# Patient Record
Sex: FEMALE | Race: WHITE | NOT HISPANIC OR LATINO | Employment: UNEMPLOYED | URBAN - METROPOLITAN AREA
[De-identification: names, ages, dates, MRNs, and addresses within clinical notes are randomized per-mention and may not be internally consistent; named-entity substitution may affect disease eponyms.]

---

## 2024-10-23 ENCOUNTER — OFFICE VISIT (OUTPATIENT)
Dept: FAMILY MEDICINE CLINIC | Facility: HOSPITAL | Age: 34
End: 2024-10-23
Payer: COMMERCIAL

## 2024-10-23 VITALS
WEIGHT: 194.6 LBS | HEIGHT: 65 IN | DIASTOLIC BLOOD PRESSURE: 80 MMHG | HEART RATE: 100 BPM | BODY MASS INDEX: 32.42 KG/M2 | SYSTOLIC BLOOD PRESSURE: 130 MMHG | TEMPERATURE: 98.2 F | OXYGEN SATURATION: 98 %

## 2024-10-23 DIAGNOSIS — N92.6 MISSED PERIOD: Primary | ICD-10-CM

## 2024-10-23 PROCEDURE — 99213 OFFICE O/P EST LOW 20 MIN: CPT

## 2024-10-23 NOTE — PROGRESS NOTES
Ambulatory Visit  Name: Yael Bird      : 1990      MRN: [id number]  Encounter Provider: Hortencia Parikh DO  Encounter Date: 10/23/2024   Encounter department: [facility] PRIMARY CARE [address]    Assessment & Plan  Missed period  - Will order quantitative testing to [facility]  - Continue to monitor menstrual periods  - May need hormonal testing at that point if complaints still not resolved  Orders:    hCG, quantitative; Future    hCG, quantitative       History of Present Illness     13-year-old female presents with  for complaint of missed period.  Patient states that her period used becomes on the 15th of every month, but has not happened this month.  States last time this has happened, she had a miscarriage which was in .  States her last pregnancy test was a few days ago and she has taken multiple which have been negative.  Denies being on any birth control.  Symptomatic wise she is reporting some on and off fatigue as well as some hip pain and low back pain which is now resolved.  Denies nausea, vomiting.  Patient entered menarche at age 10, mother and grandmother entered menopause in early 40s.  Denies any particular stressful event for the past month that would cause concern for reason to missed her menstrual period.  Patient states she does have a GYN that she would establish with in the event that she does become pregnant.        History obtained from : patient and patient's Significant Other  Review of Systems   Gastrointestinal:  Negative for abdominal pain, nausea and vomiting.   Genitourinary:  Positive for menstrual problem.   Musculoskeletal:  Positive for back pain.     No current outpatient medications on file prior to visit.     No current facility-administered medications on file prior to visit.      Social History     Tobacco Use    Smoking status: Never    Smokeless tobacco: Never   Vaping Use    Vaping status: Never Used   Substance and Sexual Activity     "Alcohol use: Not Currently     Comment: rarely    Drug use: No    Sexual activity: Yes     Partners: Male     Birth control/protection: None         Objective     /80   Pulse 100   Temp 98.2 °F (36.8 °C)   Ht 5' 4.5\" (1.638 m)   Wt 88.3 kg (194 lb 9.6 oz)   SpO2 98%   BMI 32.89 kg/m²     Physical Exam  Vitals reviewed. Exam conducted with a chaperone present.   Constitutional:       Appearance: Normal appearance.   HENT:      Head: Normocephalic and atraumatic.   Cardiovascular:      Rate and Rhythm: Normal rate and regular rhythm.      Heart sounds: Normal heart sounds.   Pulmonary:      Breath sounds: Normal breath sounds.   Neurological:      Mental Status: She is alert.   Psychiatric:         Mood and Affect: Mood normal.         Behavior: Behavior normal.         Hortencia Parikh, DO  Family Medicine    "

## 2024-10-24 ENCOUNTER — TELEPHONE (OUTPATIENT)
Age: 34
End: 2024-10-24

## 2024-10-24 DIAGNOSIS — N92.6 MISSED PERIOD: Primary | ICD-10-CM

## 2024-10-24 NOTE — TELEPHONE ENCOUNTER
Patient  called back states [facility] does not take insurance. Requesting order to be changed to [facility]. Advised order for [facility] still active.

## 2024-10-24 NOTE — TELEPHONE ENCOUNTER
Hcg quantitative test, reordered for patient to go to [facility], patients  called to follow up the [facility] location fax number is [phone].

## 2024-10-24 NOTE — TELEPHONE ENCOUNTER
Patient  calling requesting HCG lab order to be changed over to have lab done at [facility].      Please review.  Thank you

## 2025-03-24 ENCOUNTER — NURSE TRIAGE (OUTPATIENT)
Age: 35
End: 2025-03-24

## 2025-03-24 ENCOUNTER — HOSPITAL ENCOUNTER (EMERGENCY)
Facility: HOSPITAL | Age: 35
Discharge: HOME | End: 2025-03-24
Attending: EMERGENCY MEDICINE
Payer: COMMERCIAL

## 2025-03-24 VITALS
DIASTOLIC BLOOD PRESSURE: 64 MMHG | HEART RATE: 81 BPM | WEIGHT: 194 LBS | BODY MASS INDEX: 32.32 KG/M2 | HEIGHT: 65 IN | TEMPERATURE: 98.3 F | OXYGEN SATURATION: 99 % | OXYGEN SATURATION: 99 % | SYSTOLIC BLOOD PRESSURE: 126 MMHG | TEMPERATURE: 98.3 F | RESPIRATION RATE: 18 BRPM | DIASTOLIC BLOOD PRESSURE: 64 MMHG | HEIGHT: 65 IN | RESPIRATION RATE: 18 BRPM | SYSTOLIC BLOOD PRESSURE: 126 MMHG | WEIGHT: 194 LBS | BODY MASS INDEX: 32.32 KG/M2 | HEART RATE: 81 BPM

## 2025-03-24 DIAGNOSIS — R82.71 ASYMPTOMATIC BACTERIURIA: ICD-10-CM

## 2025-03-24 DIAGNOSIS — O21.9 NAUSEA AND VOMITING IN PREGNANCY: Primary | ICD-10-CM

## 2025-03-24 LAB
ALBUMIN SERPL BCG-MCNC: 4.3 G/DL (ref 3.5–5)
ALBUMIN SERPL BCG-MCNC: 4.3 G/DL (ref 3.5–5)
ALP SERPL-CCNC: 57 U/L (ref 34–104)
ALP SERPL-CCNC: 57 U/L (ref 34–104)
ALT SERPL W P-5'-P-CCNC: 14 U/L (ref 7–52)
ALT SERPL W P-5'-P-CCNC: 14 U/L (ref 7–52)
ANION GAP SERPL CALCULATED.3IONS-SCNC: 9 MMOL/L (ref 4–13)
ANION GAP SERPL CALCULATED.3IONS-SCNC: 9 MMOL/L (ref 4–13)
AST SERPL W P-5'-P-CCNC: 13 U/L (ref 13–39)
AST SERPL W P-5'-P-CCNC: 13 U/L (ref 13–39)
ATRIAL RATE: 78 BPM
ATRIAL RATE: 78 BPM
B-HCG SERPL-ACNC: ABNORMAL MIU/ML (ref 0–5)
B-HCG SERPL-ACNC: ABNORMAL MIU/ML (ref 0–5)
BACTERIA UR QL AUTO: ABNORMAL /HPF
BACTERIA UR QL AUTO: ABNORMAL /HPF
BASOPHILS # BLD AUTO: 0.02 THOUSANDS/ÂΜL (ref 0–0.1)
BASOPHILS # BLD AUTO: 0.02 THOUSANDS/ÂΜL (ref 0–0.1)
BASOPHILS NFR BLD AUTO: 0 % (ref 0–1)
BASOPHILS NFR BLD AUTO: 0 % (ref 0–1)
BILIRUB SERPL-MCNC: 0.69 MG/DL (ref 0.2–1)
BILIRUB SERPL-MCNC: 0.69 MG/DL (ref 0.2–1)
BILIRUB UR QL STRIP: NEGATIVE
BILIRUB UR QL STRIP: NEGATIVE
BUN SERPL-MCNC: 9 MG/DL (ref 5–25)
BUN SERPL-MCNC: 9 MG/DL (ref 5–25)
CALCIUM SERPL-MCNC: 9 MG/DL (ref 8.4–10.2)
CALCIUM SERPL-MCNC: 9 MG/DL (ref 8.4–10.2)
CHLORIDE SERPL-SCNC: 102 MMOL/L (ref 96–108)
CHLORIDE SERPL-SCNC: 102 MMOL/L (ref 96–108)
CLARITY UR: ABNORMAL
CLARITY UR: ABNORMAL
CO2 SERPL-SCNC: 25 MMOL/L (ref 21–32)
CO2 SERPL-SCNC: 25 MMOL/L (ref 21–32)
COLOR UR: YELLOW
COLOR UR: YELLOW
CREAT SERPL-MCNC: 0.74 MG/DL (ref 0.6–1.3)
CREAT SERPL-MCNC: 0.74 MG/DL (ref 0.6–1.3)
EOSINOPHIL # BLD AUTO: 0.04 THOUSAND/ÂΜL (ref 0–0.61)
EOSINOPHIL # BLD AUTO: 0.04 THOUSAND/ÂΜL (ref 0–0.61)
EOSINOPHIL NFR BLD AUTO: 0 % (ref 0–6)
EOSINOPHIL NFR BLD AUTO: 0 % (ref 0–6)
ERYTHROCYTE [DISTWIDTH] IN BLOOD BY AUTOMATED COUNT: 15 % (ref 11.6–15.1)
ERYTHROCYTE [DISTWIDTH] IN BLOOD BY AUTOMATED COUNT: 15 % (ref 11.6–15.1)
EXT PREGNANCY TEST URINE: POSITIVE
EXT PREGNANCY TEST URINE: POSITIVE
EXT. CONTROL: ABNORMAL
EXT. CONTROL: ABNORMAL
FLUAV RNA RESP QL NAA+PROBE: NEGATIVE
FLUAV RNA RESP QL NAA+PROBE: NEGATIVE
FLUBV RNA RESP QL NAA+PROBE: NEGATIVE
FLUBV RNA RESP QL NAA+PROBE: NEGATIVE
GFR SERPL CREATININE-BSD FRML MDRD: 103 ML/MIN/1.73SQ M
GFR SERPL CREATININE-BSD FRML MDRD: 103 ML/MIN/1.73SQ M
GLUCOSE SERPL-MCNC: 128 MG/DL (ref 65–140)
GLUCOSE SERPL-MCNC: 128 MG/DL (ref 65–140)
GLUCOSE UR STRIP-MCNC: NEGATIVE MG/DL
GLUCOSE UR STRIP-MCNC: NEGATIVE MG/DL
HCT VFR BLD AUTO: 42.7 % (ref 34.8–46.1)
HCT VFR BLD AUTO: 42.7 % (ref 34.8–46.1)
HGB BLD-MCNC: 13.3 G/DL (ref 11.5–15.4)
HGB BLD-MCNC: 13.3 G/DL (ref 11.5–15.4)
HGB UR QL STRIP.AUTO: NEGATIVE
HGB UR QL STRIP.AUTO: NEGATIVE
IMM GRANULOCYTES # BLD AUTO: 0.06 THOUSAND/UL (ref 0–0.2)
IMM GRANULOCYTES # BLD AUTO: 0.06 THOUSAND/UL (ref 0–0.2)
IMM GRANULOCYTES NFR BLD AUTO: 1 % (ref 0–2)
IMM GRANULOCYTES NFR BLD AUTO: 1 % (ref 0–2)
KETONES UR STRIP-MCNC: NEGATIVE MG/DL
KETONES UR STRIP-MCNC: NEGATIVE MG/DL
LEUKOCYTE ESTERASE UR QL STRIP: ABNORMAL
LEUKOCYTE ESTERASE UR QL STRIP: ABNORMAL
LIPASE SERPL-CCNC: 21 U/L (ref 11–82)
LIPASE SERPL-CCNC: 21 U/L (ref 11–82)
LYMPHOCYTES # BLD AUTO: 1.74 THOUSANDS/ÂΜL (ref 0.6–4.47)
LYMPHOCYTES # BLD AUTO: 1.74 THOUSANDS/ÂΜL (ref 0.6–4.47)
LYMPHOCYTES NFR BLD AUTO: 15 % (ref 14–44)
LYMPHOCYTES NFR BLD AUTO: 15 % (ref 14–44)
MAGNESIUM SERPL-MCNC: 2.1 MG/DL (ref 1.9–2.7)
MAGNESIUM SERPL-MCNC: 2.1 MG/DL (ref 1.9–2.7)
MCH RBC QN AUTO: 24 PG (ref 26.8–34.3)
MCH RBC QN AUTO: 24 PG (ref 26.8–34.3)
MCHC RBC AUTO-ENTMCNC: 31.1 G/DL (ref 31.4–37.4)
MCHC RBC AUTO-ENTMCNC: 31.1 G/DL (ref 31.4–37.4)
MCV RBC AUTO: 77 FL (ref 82–98)
MCV RBC AUTO: 77 FL (ref 82–98)
MONOCYTES # BLD AUTO: 0.7 THOUSAND/ÂΜL (ref 0.17–1.22)
MONOCYTES # BLD AUTO: 0.7 THOUSAND/ÂΜL (ref 0.17–1.22)
MONOCYTES NFR BLD AUTO: 6 % (ref 4–12)
MONOCYTES NFR BLD AUTO: 6 % (ref 4–12)
MUCOUS THREADS UR QL AUTO: ABNORMAL
MUCOUS THREADS UR QL AUTO: ABNORMAL
NEUTROPHILS # BLD AUTO: 9 THOUSANDS/ÂΜL (ref 1.85–7.62)
NEUTROPHILS # BLD AUTO: 9 THOUSANDS/ÂΜL (ref 1.85–7.62)
NEUTS SEG NFR BLD AUTO: 78 % (ref 43–75)
NEUTS SEG NFR BLD AUTO: 78 % (ref 43–75)
NITRITE UR QL STRIP: NEGATIVE
NITRITE UR QL STRIP: NEGATIVE
NON-SQ EPI CELLS URNS QL MICRO: ABNORMAL /HPF
NON-SQ EPI CELLS URNS QL MICRO: ABNORMAL /HPF
NRBC BLD AUTO-RTO: 0 /100 WBCS
NRBC BLD AUTO-RTO: 0 /100 WBCS
P AXIS: 19 DEGREES
P AXIS: 19 DEGREES
PH UR STRIP.AUTO: 6.5 [PH]
PH UR STRIP.AUTO: 6.5 [PH]
PLATELET # BLD AUTO: 321 THOUSANDS/UL (ref 149–390)
PLATELET # BLD AUTO: 321 THOUSANDS/UL (ref 149–390)
PMV BLD AUTO: 10.3 FL (ref 8.9–12.7)
PMV BLD AUTO: 10.3 FL (ref 8.9–12.7)
POTASSIUM SERPL-SCNC: 3.6 MMOL/L (ref 3.5–5.3)
POTASSIUM SERPL-SCNC: 3.6 MMOL/L (ref 3.5–5.3)
PR INTERVAL: 160 MS
PR INTERVAL: 160 MS
PROT SERPL-MCNC: 7.6 G/DL (ref 6.4–8.4)
PROT SERPL-MCNC: 7.6 G/DL (ref 6.4–8.4)
PROT UR STRIP-MCNC: ABNORMAL MG/DL
PROT UR STRIP-MCNC: ABNORMAL MG/DL
QRS AXIS: 105 DEGREES
QRS AXIS: 105 DEGREES
QRSD INTERVAL: 72 MS
QRSD INTERVAL: 72 MS
QT INTERVAL: 392 MS
QT INTERVAL: 392 MS
QTC INTERVAL: 446 MS
QTC INTERVAL: 446 MS
RBC # BLD AUTO: 5.54 MILLION/UL (ref 3.81–5.12)
RBC # BLD AUTO: 5.54 MILLION/UL (ref 3.81–5.12)
RBC #/AREA URNS AUTO: ABNORMAL /HPF
RBC #/AREA URNS AUTO: ABNORMAL /HPF
RSV RNA RESP QL NAA+PROBE: NEGATIVE
RSV RNA RESP QL NAA+PROBE: NEGATIVE
SARS-COV-2 RNA RESP QL NAA+PROBE: NEGATIVE
SARS-COV-2 RNA RESP QL NAA+PROBE: NEGATIVE
SODIUM SERPL-SCNC: 136 MMOL/L (ref 135–147)
SODIUM SERPL-SCNC: 136 MMOL/L (ref 135–147)
SP GR UR STRIP.AUTO: 1.02 (ref 1–1.03)
SP GR UR STRIP.AUTO: 1.02 (ref 1–1.03)
T WAVE AXIS: 6 DEGREES
T WAVE AXIS: 6 DEGREES
UROBILINOGEN UR STRIP-ACNC: <2 MG/DL
UROBILINOGEN UR STRIP-ACNC: <2 MG/DL
VENTRICULAR RATE: 78 BPM
VENTRICULAR RATE: 78 BPM
WBC # BLD AUTO: 11.56 THOUSAND/UL (ref 4.31–10.16)
WBC # BLD AUTO: 11.56 THOUSAND/UL (ref 4.31–10.16)
WBC #/AREA URNS AUTO: ABNORMAL /HPF
WBC #/AREA URNS AUTO: ABNORMAL /HPF

## 2025-03-24 PROCEDURE — 87086 URINE CULTURE/COLONY COUNT: CPT | Performed by: EMERGENCY MEDICINE

## 2025-03-24 PROCEDURE — 93005 ELECTROCARDIOGRAM TRACING: CPT

## 2025-03-24 PROCEDURE — 36415 COLL VENOUS BLD VENIPUNCTURE: CPT | Performed by: EMERGENCY MEDICINE

## 2025-03-24 PROCEDURE — 96374 THER/PROPH/DIAG INJ IV PUSH: CPT

## 2025-03-24 PROCEDURE — 80053 COMPREHEN METABOLIC PANEL: CPT | Performed by: EMERGENCY MEDICINE

## 2025-03-24 PROCEDURE — 93010 ELECTROCARDIOGRAM REPORT: CPT | Performed by: INTERNAL MEDICINE

## 2025-03-24 PROCEDURE — 0241U HB NFCT DS VIR RESP RNA 4 TRGT: CPT | Performed by: EMERGENCY MEDICINE

## 2025-03-24 PROCEDURE — 81001 URINALYSIS AUTO W/SCOPE: CPT | Performed by: EMERGENCY MEDICINE

## 2025-03-24 PROCEDURE — 85025 COMPLETE CBC W/AUTO DIFF WBC: CPT | Performed by: EMERGENCY MEDICINE

## 2025-03-24 PROCEDURE — 83735 ASSAY OF MAGNESIUM: CPT | Performed by: EMERGENCY MEDICINE

## 2025-03-24 PROCEDURE — 84702 CHORIONIC GONADOTROPIN TEST: CPT | Performed by: EMERGENCY MEDICINE

## 2025-03-24 PROCEDURE — 83690 ASSAY OF LIPASE: CPT | Performed by: EMERGENCY MEDICINE

## 2025-03-24 PROCEDURE — 96361 HYDRATE IV INFUSION ADD-ON: CPT

## 2025-03-24 PROCEDURE — 81025 URINE PREGNANCY TEST: CPT | Performed by: EMERGENCY MEDICINE

## 2025-03-24 PROCEDURE — 99284 EMERGENCY DEPT VISIT MOD MDM: CPT

## 2025-03-24 PROCEDURE — 99284 EMERGENCY DEPT VISIT MOD MDM: CPT | Performed by: EMERGENCY MEDICINE

## 2025-03-24 RX ORDER — METOCLOPRAMIDE 10 MG/1
10 TABLET ORAL EVERY 8 HOURS PRN
Qty: 30 TABLET | Refills: 0 | Status: SHIPPED | OUTPATIENT
Start: 2025-03-24

## 2025-03-24 RX ORDER — METOCLOPRAMIDE HYDROCHLORIDE 5 MG/ML
10 INJECTION INTRAMUSCULAR; INTRAVENOUS ONCE
Status: COMPLETED | OUTPATIENT
Start: 2025-03-24 | End: 2025-03-24

## 2025-03-24 RX ORDER — CEPHALEXIN 500 MG/1
500 CAPSULE ORAL EVERY 12 HOURS SCHEDULED
Qty: 10 CAPSULE | Refills: 0 | Status: SHIPPED | OUTPATIENT
Start: 2025-03-24 | End: 2025-03-29

## 2025-03-24 RX ADMIN — CEPHALEXIN 500 MG: 250 CAPSULE ORAL at 12:51

## 2025-03-24 RX ADMIN — SODIUM CHLORIDE 1000 ML: 0.9 INJECTION, SOLUTION INTRAVENOUS at 09:37

## 2025-03-24 RX ADMIN — METOCLOPRAMIDE 10 MG: 5 INJECTION, SOLUTION INTRAMUSCULAR; INTRAVENOUS at 09:36

## 2025-03-24 NOTE — ED NOTES
Patient tolerating PO fluids/snacks. Waiting for d/c by provider     Lesly Alvarez RN  03/24/25 9301

## 2025-03-24 NOTE — TELEPHONE ENCOUNTER
"FOLLOW UP: ESC message to Dr. Andres ( per Dr. Andres, patient to go to ED.)     REASON FOR CONVERSATION: Vomiting - Severe    SYMPTOMS: vomiting, nausea, diarrhea    OTHER: This patient is 6 weeks 2 days pregnant according to LMP of 2/8/25. Her  called ( on communication consent) with her by his side. She is c/o severe vomiting/ nausea since Friday night (3/21). She cannot keep any food or fluid down. Also has diarrhea. Vomited 3 times in the last 24 hours. Last urinated in the middle of the night, dry lips, abdominal tightness. Denies dizziness, weakness, fever. Hasn't taken any medications. She has been in bed and cannot get out.     DISPOSITION: Go to ED/C Now (Or to Office with PCP Approval)    Called patient's , Alexi villalobos and advised patient go to [facility] ED at this time. He verbalized understanding.         Reason for Disposition   SEVERE vomiting (e.g., > 6 times / day) and present > 8 hours    Answer Assessment - Initial Assessment Questions  1. SEVERITY - NAUSEA: \"How bad is the nausea?\" (e.g., none; mild, moderate, severe)      severe  2. SEVERITY - VOMITING: \"Are you vomiting?\" If Yes, ask: \"How many times have you vomited in the past 24 hours?\" (e.g., nausea only; mild, moderate or severe vomiting)      3 times in 24 hours   3. ONSET: \"When did the nausea or vomiting begin?\"       Friday night - (3/21)  4. FLUIDS: \"What fluids or food have you vomited up today?\" \"Are you able to keep any liquids down?\"      No   5. TREATMENT: \"What have you been doing so far to treat this?\"       No   6. DEHYDRATION: \"When was the last time you urinated?\" \"Are you feeling lightheaded?\" \"Weight loss?\"      Last time urinated - last night. Dry lips.   7. PREGNANCY: \"How many weeks pregnant are you?\" \"How has the pregnancy been going?\"      6 weeks 2 days   8. NICOLAS: \"What date are you expecting to deliver?\"      LMP: 2/8/25  9. MEDICINES: \"What medicines are you taking?\" (e.g., iron, opioid pain " "medicines, prenatal vitamins, vitamin B6)      Prenatal vitamins   10. OTHER SYMPTOMS: \"Do you have any other symptoms?\" (e.g., diarrhea, fever)        Diarrhea , abdominal tightness    Protocols used: Pregnancy - Morning Sickness (Nausea and Vomiting of Pregnancy)-Adult-OH    "

## 2025-03-24 NOTE — ED PROVIDER NOTES
Time reflects when diagnosis was documented in both MDM as applicable and the Disposition within this note       Time User Action Codes Description Comment    3/24/2025 12:44 PM Joy Brock Add [O21.9] Nausea and vomiting in pregnancy     3/24/2025 12:44 PM Joy Brock Add [R82.71] Asymptomatic bacteriuria           ED Disposition       ED Disposition   Discharge    Condition   Stable    Date/Time   Mon Mar 24, 2025 12:45 PM    Comment   Milly Carrillo discharge to home/self care.                   Assessment & Plan       Medical Decision Making  Blood work, viral swab, UA  IV fluids, antiemetics and continue to monitor patient for any worsening symptoms.    Cussed all lab work with patient.  UA showed concern for bacteriuria.  Patient is not symptomatic.  Patient felt better after medications given in the ED.  Patient then tolerated p.o.  At this time patient symptoms may be a combination of viral syndrome versus morning sickness secondary to pregnancy.  Patient discharged home on Keflex for asymptomatic bacteriuria as well as Reglan.  Nausea/vomiting.  Patient discharged with follow-up to PCP as well as her OB/GYN.  Close return instructions given to return to the ER for any worsening symptoms.  Patient agrees with discharge plan.  Patient well appearing at time of discharge.    Please Note: Flu[employer] Direct voice recognition software may have been used in the creation of this document. Wrong words or sound a like substitutions may have occurred due to the inherent limitations of the voice software.         Amount and/or Complexity of Data Reviewed  Labs: ordered.  ECG/medicine tests: ordered and independent interpretation performed. Decision-making details documented in ED Course.    Risk  Prescription drug management.        ED Course as of 03/24/25 1548   Mon Mar 24, 2025   1242 Patient reexamined at bedside.  Patient is feeling significantly better.  Patient tolerated ginger ale and crackers.  UA showed  bacteriuria.  Will place patient on Keflex.  Patient will be discharged with follow-up to PCP and OB.       Medications   sodium chloride 0.9 % bolus 1,000 mL (0 mL Intravenous Stopped 3/24/25 1037)   metoclopramide (REGLAN) injection 10 mg (10 mg Intravenous Given 3/24/25 0936)   cephalexin (KEFLEX) capsule 500 mg (500 mg Oral Given 3/24/25 1251)       ED Risk Strat Scores                            SBIRT 20yo+      Flowsheet Row Most Recent Value   Initial Alcohol Screen: US AUDIT-C     1. How often do you have a drink containing alcohol? 0 Filed at: 03/24/2025 0916   2. How many drinks containing alcohol do you have on a typical day you are drinking?  0 Filed at: 03/24/2025 0916   3a. Male UNDER 65: How often do you have five or more drinks on one occasion? 0 Filed at: 03/24/2025 0916   3b. FEMALE Any Age, or MALE 65+: How often do you have 4 or more drinks on one occassion? 0 Filed at: 03/24/2025 0916   Audit-C Score 0 Filed at: 03/24/2025 0916   CHARMAINE: How many times in the past year have you...    Used an illegal drug or used a prescription medication for non-medical reasons? Never Filed at: 03/24/2025 0916                            History of Present Illness       Chief Complaint   Patient presents with    Vomiting     Patient presents to ED with vomiting /diarrhea since Friday night       No past medical history on file.   Past Surgical History:   Procedure Laterality Date    WISDOM TOOTH EXTRACTION        No family history on file.       E-Cigarette/Vaping      E-Cigarette/Vaping Substances      I have reviewed and agree with the history as documented.     35-year-old G2, P0 approximately 6 weeks 6 days pregnant, presents to the ED for evaluation of increasing nausea and vomiting that is worse at night.  Patient started to have diarrhea over the past 2 days.  Patient denies any fevers or chills.  Patient was seen at [place] emergency department on 3/20/2025 for some mild vaginal bleeding.  Patient had a  transvaginal ultrasound that confirmed IUP.  Patient states that since that time bleeding has subsided.  Patient currently does not have any abdominal pain or vaginal discharge.  Patient called her OB office for worsening nausea and was told to come to the ED for further evaluation.  Patient thinks she is dehydrated.  Patient not able to eat or drink anything the past few days.  He does not have anything for nausea at home.      History provided by:  Patient  Vomiting  Associated symptoms: diarrhea    Associated symptoms: no abdominal pain, no arthralgias, no chills, no cough, no fever and no sore throat        Review of Systems   Constitutional:  Negative for chills and fever.   HENT:  Negative for ear pain and sore throat.    Eyes:  Negative for pain and visual disturbance.   Respiratory:  Negative for cough and shortness of breath.    Cardiovascular:  Negative for chest pain and palpitations.   Gastrointestinal:  Positive for diarrhea, nausea and vomiting. Negative for abdominal pain.   Genitourinary:  Negative for dysuria and hematuria.   Musculoskeletal:  Negative for arthralgias and back pain.   Skin:  Negative for color change and rash.   Neurological:  Negative for seizures and syncope.   All other systems reviewed and are negative.          Objective       ED Triage Vitals [03/24/25 0917]   Temperature Pulse Blood Pressure Respirations SpO2 Patient Position - Orthostatic VS   98.3 °F (36.8 °C) 104 151/72 18 100 % Lying      Temp src Heart Rate Source BP Location FiO2 (%) Pain Score    -- Monitor Right arm -- --      Vitals      Date and Time Temp Pulse SpO2 Resp BP Pain Score FACES Pain Rating User   03/24/25 1200 -- 81 99 % -- 126/64 -- -- AL   03/24/25 0917 98.3 °F (36.8 °C) 104 100 % 18 151/72 -- -- EW            Physical Exam  Vitals and nursing note reviewed.   Constitutional:       General: She is not in acute distress.     Appearance: She is well-developed.   HENT:      Head: Normocephalic and  atraumatic.   Eyes:      Conjunctiva/sclera: Conjunctivae normal.   Cardiovascular:      Rate and Rhythm: Normal rate and regular rhythm.      Heart sounds: No murmur heard.  Pulmonary:      Effort: Pulmonary effort is normal. No respiratory distress.      Breath sounds: Normal breath sounds.   Abdominal:      General: Abdomen is flat. Bowel sounds are normal. There is no distension.      Palpations: Abdomen is soft.      Tenderness: There is no abdominal tenderness.   Musculoskeletal:         General: No swelling.      Cervical back: Neck supple.   Skin:     General: Skin is warm and dry.      Capillary Refill: Capillary refill takes less than 2 seconds.   Neurological:      Mental Status: She is alert.   Psychiatric:         Mood and Affect: Mood normal.         Results Reviewed       Procedure Component Value Units Date/Time    Urine Microscopic [[id number]]  (Abnormal) Collected: 03/24/25 1111    Lab Status: Final result Specimen: Urine, Clean Catch Updated: 03/24/25 1137     RBC, UA 2-4 /hpf      WBC, UA 10-20 /hpf      Epithelial Cells Moderate /hpf      Bacteria, UA Moderate /hpf      MUCUS THREADS Occasional     URINE COMMENT --    UA w Reflex to Microscopic w Reflex to Culture [[id number]]  (Abnormal) Collected: 03/24/25 1111    Lab Status: Final result Specimen: Urine, Clean Catch Updated: 03/24/25 1136     Color, UA Yellow     Clarity, UA Cloudy     Specific Gravity, UA 1.025     pH, UA 6.5     Leukocytes, UA Small     Nitrite, UA Negative     Protein, UA 30 (1+) mg/dl      Glucose, UA Negative mg/dl      Ketones, UA Negative mg/dl      Urobilinogen, UA <2.0 mg/dl      Bilirubin, UA Negative     Occult Blood, UA Negative     URINE COMMENT --    Urine culture [[id number]] Collected: 03/24/25 1111    Lab Status: In process Specimen: Urine, Clean Catch Updated: 03/24/25 1136    hCG, quantitative [[id number]]  (Abnormal) Collected: 03/24/25 0938    Lab Status: Final result Specimen: Blood from Arm, Left Updated:  03/24/25 1132     HCG, Quant 62,644.9 mIU/mL     Narrative:       Expected Ranges:    HCG results between 5.0 and 25.0 mIU/mL may be indicative of early pregnancy but should be interpreted in light of the total clinical presentation.    HCG can rise to detectable levels in rito and post menopausal women (0-11.6 mIU/mL).     Approximate               Approximate HCG  Gestation age          Concentration ( mIU/mL)  _____________          ______________________   Weeks                      HCG values  0.2-1                       5-50  1-2                           2-3                         100-5000  3-4                         500-11412  4-5                         1000-89985  5-6                         77434-585858  6-8                         80238-570230  8-12                        [id number]      FLU/RSV/COVID - if FLU/RSV clinically relevant (2hr TAT) [[id number]]  (Normal) Collected: 03/24/25 1034    Lab Status: Final result Specimen: Nares from Nose Updated: 03/24/25 1124     SARS-CoV-2 Negative     INFLUENZA A PCR Negative     INFLUENZA B PCR Negative     RSV PCR Negative    Narrative:      This test has been performed using the CoV-2/Flu/RSV plus assay on the [employer] GeneXpert platform. This test has been validated by the  and verified by the performing laboratory.     This test is designed to amplify and detect the following: nucleocapsid (N), envelope (E), and RNA-dependent RNA polymerase (RdRP) genes of the SARS-CoV-2 genome; matrix (M), basic polymerase (PB2), and acidic protein (PA) segments of the influenza A genome; matrix (M) and non-structural protein (NS) segments of the influenza B genome, and the nucleocapsid genes of RSV A and RSV B.     Positive results are indicative of the presence of Flu A, Flu B, RSV, and/or SARS-CoV-2 RNA. Positive results for SARS-CoV-2 or suspected novel influenza should be reported to state, local, or federal health departments according to local  reporting requirements.      All results should be assessed in conjunction with clinical presentation and other laboratory markers for clinical management.     FOR PEDIATRIC PATIENTS - copy/paste COVID Guidelines URL to browser: [email or web address]       POCT pregnancy, urine [[id number]]  (Abnormal) Collected: 03/24/25 1112    Lab Status: Final result Updated: 03/24/25 1112     EXT Preg Test, Ur Positive     Control Valid    Comprehensive metabolic panel [[id number]] Collected: 03/24/25 0938    Lab Status: Final result Specimen: Blood from Arm, Left Updated: 03/24/25 1009     Sodium 136 mmol/L      Potassium 3.6 mmol/L      Chloride 102 mmol/L      CO2 25 mmol/L      ANION GAP 9 mmol/L      BUN 9 mg/dL      Creatinine 0.74 mg/dL      Glucose 128 mg/dL      Calcium 9.0 mg/dL      AST 13 U/L      ALT 14 U/L      Alkaline Phosphatase 57 U/L      Total Protein 7.6 g/dL      Albumin 4.3 g/dL      Total Bilirubin 0.69 mg/dL      eGFR 103 ml/min/1.73sq m     Narrative:      National Kidney Disease Foundation guidelines for Chronic Kidney Disease (CKD):     Stage 1 with normal or high GFR (GFR > 90 mL/min/1.73 square meters)    Stage 2 Mild CKD (GFR = 60-89 mL/min/1.73 square meters)    Stage 3A Moderate CKD (GFR = 45-59 mL/min/1.73 square meters)    Stage 3B Moderate CKD (GFR = 30-44 mL/min/1.73 square meters)    Stage 4 Severe CKD (GFR = 15-29 mL/min/1.73 square meters)    Stage 5 End Stage CKD (GFR <15 mL/min/1.73 square meters)  Note: GFR calculation is accurate only with a steady state creatinine    Lipase [[id number]]  (Normal) Collected: 03/24/25 0938    Lab Status: Final result Specimen: Blood from Arm, Left Updated: 03/24/25 1009     Lipase 21 u/L     Magnesium [[id number]]  (Normal) Collected: 03/24/25 0938    Lab Status: Final result Specimen: Blood from Arm, Left Updated: 03/24/25 1009     Magnesium 2.1 mg/dL     CBC and differential [[id number]]  (Abnormal)  Collected: 03/24/25 0938    Lab Status: Final result Specimen: Blood from Arm, Left Updated: 03/24/25 0946     WBC 11.56 Thousand/uL      RBC 5.54 Million/uL      Hemoglobin 13.3 g/dL      Hematocrit 42.7 %      MCV 77 fL      MCH 24.0 pg      MCHC 31.1 g/dL      RDW 15.0 %      MPV 10.3 fL      Platelets 321 Thousands/uL      nRBC 0 /100 WBCs      Segmented % 78 %      Immature Grans % 1 %      Lymphocytes % 15 %      Monocytes % 6 %      Eosinophils Relative 0 %      Basophils Relative 0 %      Absolute Neutrophils 9.00 Thousands/µL      Absolute Immature Grans 0.06 Thousand/uL      Absolute Lymphocytes 1.74 Thousands/µL      Absolute Monocytes 0.70 Thousand/µL      Eosinophils Absolute 0.04 Thousand/µL      Basophils Absolute 0.02 Thousands/µL             No orders to display       ECG 12 Lead Documentation Only    Date/Time: 3/24/2025 9:39 PM    Performed by: Joy Brock DO  Authorized by: Joy Brock DO    Indications / Diagnosis:  Nausea vomiting  ECG reviewed by me, the ED Provider: yes    Patient location:  ED  Previous ECG:     Previous ECG:  Unavailable    Comparison to cardiac monitor: Yes    Interpretation:     Interpretation: abnormal    Comments:      Sinus rhythm, rate 78, right axis deviation, no acute ST/T wave abnormalities noted, no previous EKG available for comparison.      ED Medication and Procedure Management   None     Discharge Medication List as of 3/24/2025 12:47 PM        START taking these medications    Details   cephalexin (KEFLEX) 500 mg capsule Take 1 capsule (500 mg total) by mouth every 12 (twelve) hours for 5 days, Starting Mon 3/24/2025, Until Sat 3/29/2025, Normal      metoclopramide (REGLAN) 10 mg tablet Take 1 tablet (10 mg total) by mouth every 8 (eight) hours as needed (nausea and vomiting), Starting Mon 3/24/2025, Normal           No discharge procedures on file.  ED SEPSIS DOCUMENTATION   Time reflects when diagnosis was documented in both MDM as applicable and  the Disposition within this note       Time User Action Codes Description Comment    3/24/2025 12:44 PM Joy Brock [O21.9] Nausea and vomiting in pregnancy     3/24/2025 12:44 PM Joy Brock [R82.71] Asymptomatic bacteriuria                  Joy Brock,   03/24/25 1312       Joy Brock,   03/24/25 1542

## 2025-03-25 LAB
BACTERIA UR CULT: NORMAL
BACTERIA UR CULT: NORMAL

## 2025-04-03 ENCOUNTER — ULTRASOUND (OUTPATIENT)
Dept: OBGYN CLINIC | Facility: CLINIC | Age: 35
End: 2025-04-03
Payer: COMMERCIAL

## 2025-04-03 VITALS
SYSTOLIC BLOOD PRESSURE: 132 MMHG | HEIGHT: 65 IN | DIASTOLIC BLOOD PRESSURE: 84 MMHG | WEIGHT: 201 LBS | BODY MASS INDEX: 33.49 KG/M2

## 2025-04-03 DIAGNOSIS — Z3A.01 7 WEEKS GESTATION OF PREGNANCY: ICD-10-CM

## 2025-04-03 DIAGNOSIS — N91.2 AMENORRHEA: Primary | ICD-10-CM

## 2025-04-03 DIAGNOSIS — O21.9 NAUSEA AND VOMITING IN PREGNANCY: ICD-10-CM

## 2025-04-03 PROCEDURE — 76817 TRANSVAGINAL US OBSTETRIC: CPT | Performed by: STUDENT IN AN ORGANIZED HEALTH CARE EDUCATION/TRAINING PROGRAM

## 2025-04-03 PROCEDURE — 99213 OFFICE O/P EST LOW 20 MIN: CPT | Performed by: STUDENT IN AN ORGANIZED HEALTH CARE EDUCATION/TRAINING PROGRAM

## 2025-04-03 RX ORDER — METOCLOPRAMIDE 10 MG/1
TABLET ORAL
COMMUNITY
Start: 2025-03-24

## 2025-04-03 RX ORDER — PYRIDOXINE HCL (VITAMIN B6) 25 MG
25 TABLET ORAL EVERY 6 HOURS PRN
Start: 2025-04-03

## 2025-04-03 NOTE — PROGRESS NOTES
[facility] - [address], [address], [place] [address]  Assessment & Plan  Amenorrhea  - Single live intrauterine pregnancy identified on US today. Will assign Estimated Date of Delivery: 11/15/25 based on LMP.  - Prenatal vitamin with folic acid recommended.  - Recommend Unisom and B6 for nausea and vomiting.   - Medication list reviewed. Discussed medications that should be discontinued.   - Ultrasound completed, please see Chart Review - Ob Procedures, Ultrasound Report for Interpretation.  - Briefly reviewed option for aneuploidy screening. Patient referred to [facility] for first trimester consultation.  - Will schedule nursing visit for prenatal intake.     Orders:  •  AMB US OB < 14 weeks single or first gestation level 1    Subjective:   CC:  Missed period  Yael Blum is a 35 y.o.  female who presents for missed period, now with confirmed viable pregnancy.    Patient notes that this pregnancy was planned and desired.  She was not using contraception at the time of conception. She reports she is certain of her LMP and that she has regular menses, approximately q28 days. She had an episode of vaginal bleeding on 3/20 for which she was evaluated in the ER at [place]. Records are not available for review today.     ROS: No leakage of fluid, pelvic pain, or vaginal bleeding. Present nausea.        Past Medical History:   Diagnosis Date   • Miscarriage 2023     Past Surgical History:   Procedure Laterality Date   • WISDOM TOOTH EXTRACTION Left      Family History   Problem Relation Age of Onset   • Miscarriages / Stillbirths Mother    • Diabetes Father         Striano   • Breast cancer Maternal Grandmother    • Heart disease Paternal Grandfather      Social History     Socioeconomic History   • Marital status: /Civil Union     Spouse name: None   • Number of children: None   • Years of education: None   • Highest education level: None   Occupational History   • None    Tobacco Use   • Smoking status: Never   • Smokeless tobacco: Never   Vaping Use   • Vaping status: Never Used   Substance and Sexual Activity   • Alcohol use: Not Currently     Comment: rarely   • Drug use: No   • Sexual activity: Yes     Partners: Male     Birth control/protection: None   Other Topics Concern   • None   Social History Narrative   • None     Social Drivers of Health     Financial Resource Strain: Not on file   Food Insecurity: Not on file   Transportation Needs: Not on file   Physical Activity: Not on file   Stress: Not on file   Social Connections: Not on file   Intimate Partner Violence: Not on file   Housing Stability: Not on file     Outpatient Medications Marked as Taking for the 4/3/25 encounter (Ultrasound) with Oseas Bullock MD   Medication   • doxylamine (UNISOM) 25 MG tablet   • metoclopramide (REGLAN) 10 mg tablet   • Prenatal Multivit-Min-Fe-FA (PRE-JATIN FORMULA PO)   • pyridoxine (B-6) 25 MG tablet     No Known Allergies        FIRST TRIMESTER OBSTETRIC ULTRASOUND  Date of study: 4/3/2025  Performed by: Oseas Bullock MD     INDICATION: Amenorrhea, viability    COMPARISON: None.     TECHNIQUE:   Transvaginal imaging was performed to assess the gestation, myometrial/endometrial architecture and ovarian parenchymal detail.    The study includes volumetric sweeps and traditional still imaging technique.      FINDINGS:     A single intrauterine gestation is identified.  Cardiac activity is detected at 173 bpm.      YOLK SAC:  Present and normal in size and appearance.  MEAN CROWN RUMP LENGTH:  18.8 mm = 7 weeks 5 days   AMNIOTIC FLUID/SAC SHAPE:  Within expected normal range.     UTERUS/ADNEXA:   No adnexal mass or pathologic cyst.  No free fluid identified.     IMPRESSION:    Patient's last menstrual period was 2025 (exact date).  Gestational age based on LMP: 7w5d  Gestational age based on US: 8w3d    Will assign dating based on LMP  Final Gestational age: 7w5d  Final  "Estimated Date of Delivery: 11/15/25   Fetal cardiac activity detected.  No adnexal masses seen.    Oseas Bullock MD  4/3/2025 8:44 AM    Objective:     /84 (BP Location: Right arm, Patient Position: Sitting, Cuff Size: Standard)   Ht 5' 5\" (1.651 m)   Wt 91.2 kg (201 lb)   LMP 2025 (Exact Date)   BMI 33.45 kg/m²   Pregravid Weight/BMI: Pregravid weight not on file (BMI Could not be calculated)  Current Weight: 91.2 kg (201 lb)   Total Weight Gain: Not found.   Pre-Khadra Vitals    Flowsheet Row Most Recent Value   Prenatal Assessment    Fetal Heart Rate 173   Prenatal Vitals    Blood Pressure 132/84   Weight - Scale 91.2 kg (201 lb)   Urine Albumin/Glucose    Dilation/Effacement/Station    Vaginal Drainage    Draining Fluid No   Edema    LLE Edema None   RLE Edema None   Facial Edema None           Chaperone present? Yes: Sally Ferrera MA.    General Appearance: alert and oriented, in no acute distress.   Extremities: Normal range of motion. Warm, well-perfused, non-tender.   Skin: normal, no rash or abnormalities  Neurologic: alert, oriented x3  Psychiatric: Appropriate affect, mood stable, cooperative with exam.        Oseas Bullock MD  4/3/2025 8:47 AM  "

## 2025-04-03 NOTE — PROGRESS NOTES
Ultrasound Probe Disinfection    A transvaginal ultrasound was performed.   Prior to use, disinfection was performed with High Level Disinfection Process ([employer])  Probe serial number [id number]:  [id number] was used    Sally Ferrera MA  04/03/25  8:50 AM

## 2025-04-23 NOTE — PATIENT INSTRUCTIONS
Laci Blum,     It was so nice getting to know you today. Remember if you have an urgent or time sensitive concern, please call the practice phone number so a clinical triage team member can review your symptoms and get in touch with our on call provider if necessary. If you have general questions or need help navigating our services please REPLY to this message so it comes directly to me and I will respond between other patients. If I am out of the office for any reason, another nurse navigator may reach out to help you. Our Pregnancy Essential Guide is a great online resource--please use the link below.     [facility] Pregnancy Essentials Guide  . [facility] ([email or web address])     Congratulations on your pregnancy!  We thank you for allowing us to participate in your care.    NEXT STEPS    Go to the lab to have your prenatal bloodwork competed if you have not already done so.  There is a listing of [facility] and locations in your prenatal folder. You may also visit [email or web address] or call [phone].   Please be aware that some insurance companies may require you to go to a specific lab ([employer] or [employer]). You can verify this by contacting your insurance company.   If you have decided to be screened for CF and SMA genetic testing, these tests require prior authorization and scheduling.  Prior Authorization is not a guarantee of payment. There may be out of pocket expenses that includes copays, deductibles and or coinsurance for your individual plan.  Please call [phone] if our team has not contacted you in 7 business days.  Please have your blood work completed prior to you next prenatal visit.    If you have decided to have genetic testing done at Maternal Fetal Medicine, that will be scheduled by [facility]. You may have already scheduled this appointment.  If not, please call their office to schedule this appointment.  Based on the referral placed by our office, they will know  how to schedule you appropriately.    Contact information for M[facility]etal [facility] is located in your prenatal folder. The main phone number to their office is [phone].     Return to our office for your first routine prenatal visit.     Warning Signs During Pregnancy - If you experience any problems or concerns, call the office directly.  The list below includes warning signs your providers would like you to be aware of.  If you experience any of these at any time during your pregnancy, please call us as soon as possible.    Vaginal bleeding   Sharp abdominal pain that does not go away   Fever (more than 100.4?F and is not relieved with Tylenol)   Persistent vomiting lasting greater than 24 hours   Chest pain/Shortness of breath   Pain or burning when you urinate     Call the OFFICE [phone] for any questions/emergencies.  At night or on the weekend, calls go through a triage service, please indicate it is an emergency and the DOCTOR on call will be paged.    Remember to only use [employer] for non-urgent concerns or questions.    Our doctors deliver at [facility] in [place]. The address is provided below.     [facility]  [address] [place]  [address]     Please click on the links below to review our Pregnancy Essential Guide.    [facility] Pregnancy Essentials Guide  [facility] ([email or web address])     [facility] - Virtual Tour ([employer])      To learn more about the Prenatal Education classes that [facility] offers, click the link below.  Prenatal Education Classes    Click on the link below to review [facility] Lab locations.  [facility] Lab Locations    [employer] resource  [employer] is a tool to connect you to community resources you may need.      Thank you,   Daryl BRUNSON, RN  OB Nurse Navigator

## 2025-04-23 NOTE — PROGRESS NOTES
OB INTAKE INTERVIEW  Patient is 35 y.o. who presents for OB intake at 10.5 wks  She is accompanied by  during this encounter  The father of her baby (Alexi) is involved in the pregnancy and is 38 years old.      Last Menstrual Period: 2025  Reports her cycles are regular,every 26-28 days.   Ultrasound: Measured 7 weeks 5 days on 25  Estimated Date of Delivery: 11/15/25, dating based on LMP and set by Dr Bullock.     Signs/Symptoms of Pregnancy  Current pregnancy symptoms:   Nausea: Seen in the ER on 3/24/25 for nausea and vomiting.   Nausea is worse at night, may vomit x1 in evening. Taking Reglan, Unisom/B6 as needed. Encouraged jordan take Vitamin B6 and Unisom nightly instead of as needed.    Recommended to try to eat 5-6 small meals a day, increase protein with meals/snacks, in order to keep stomach full at all times. Try bland foods like plain crackers, toast as well as carbonated drinks like gingerale. Hard peppermint or ginger candy, is a natural treatment for nausea,  B-diego pops  with B6 ( available at the pharmacy), B6 25 mg in the AM and 25 mg in PM. May combine with Unisom 12.5 mg at night. Unisom may cause drowsiness.  High complex carbohydrate snack  before bedtime. If symptoms worsen, unable to keep fluids down without vomiting for more than 12 hours, contact the office.   Constipation no  Headaches no  Cramping/spotting had an episode of vaginal bleeding on 3/20/25 and seen in [facility] ER and reports she had another episode of light pink spotting on 25. Had intercourse 2 days prior. Denies any further spotting,  and not having any cramping or pain. Instructed to call the office for any new onset of spotting or pelvic pain.   PICA cravings no    Diabetes-  There is no height or weight on file to calculate BMI.  If patient has 1 or more, please order early 1 hour GTT  History of GDM no  BMI >35 no  History of PCOS or current metformin use no  History of LGA/macrosomic infant  (4000g/9lbs) no    If patient has 2 or more, please order early 1 hour GTT  BMI>30 YES  AMA YES  First degree relative with type 2 diabetes YES,  Father with Hx of type 2 DM,    History of chronic HTN, hyperlipidemia, elevated A1C no  High risk race (, , ,  or ) no    Hypertension- if you answer yes to any of the following, please order baseline preeclampsia labs (cbc, comprehensive metabolic panel, urine protein creatinine ratio, uric acid)  History of of chronic HTN no  History of gestational HTN no  History of preeclampsia, eclampsia, or HELLP syndrome no  History of diabetes no  History of lupus,sjogrens syndrome, kidney disease no    Thyroid- if yes order TSH with reflex T4  History of thyroid disease no    Bleeding Disorder or Hx of DVT-patient or first degree relative with history of. Order the following if not done previously.   (Factor V, antithrombin III, prothrombin gene mutation, protein C and S Ag, lupus anticoagulant, anticardiolipin, beta-2 glycoprotein)   no    OB/GYN-  History of abnormal pap smear no Denies    Date of last pap smear Reports she had a pap smear done  ([facility]) that was normal   History of HPV no  History of Herpes/HSV no  History of other STI (gonorrhea, chlamydia, trich) no  History of prior  no  History of prior  no  History of  delivery prior to 36 weeks 6 days no  History of blood transfusion no  Ok for blood transfusion  yes    Substance screening-   History of tobacco use no  Currently using tobacco no  Substance Use Screen Level No Risk     MRSA Screening-   Does the pt have a hx of MRSA? no    Immunizations:  Influenza vaccine given this season no  Discussed Tdap vaccine yes  Discussed COVID Vaccine yes  History of Varicella or Vaccination Had varicella as a child     Genetic/MFM-  Do you or your partner have a history of any of the following in yourselves or first degree relatives?  Cystic fibrosis  no  Spinal muscular atrophy no  Hemoglobinopathy/Sickle Cell/Thalassemia no  Fragile X Intellectual Disability no    If yes, discuss Carrier Screening and recommend consultation with MFM/Genetic Counseling and place specific [facility] Referral for.    If no, discuss Carrier Screening being completed once in a lifetime as a standard of care lab test. Place orders for Cystic Fibrosis Gene Test (PVQ253) and Spinal Muscular Atrophy DNA ([id number])  Provided [facility] cost option information for inheritest CF/SMA carrier screening. Pt will verify insurance coverage, if she desires to proceed with testing, she will call to have orders placed.    Appointment for Nuchal Translucency Ultrasound at [facility] scheduled for 05/7/25      Interview education  [facility] Pregnancy Essentials Book reviewed, discussed and attached to their AVS yes    Ambulatory Referral to  placed  EPDS: 4    Prenatal lab work scripts YES  Preferred Lab: [facility]   Extra labs ordered  Early 1 hour -(AMA and BMI>30 and father has DM)     Aspirin/Preeclampsia Screen  Risk Level Risk Factor Recommendation   LOW Prior Uncomplicated full-term delivery no No Aspirin recommendation        MODERATE Nulliparity YES Recommend low-dose aspirin if     BMI>30 YES 2 or more moderate risk factors    Family History Preeclampsia (mother/sister) no     35yr old or greater YES     Black Race, Concern for SDOH/Low Socioeconomic no     IVF Pregnancy  no     Personal History Risks (low birth weight, prior adverse preg outcome, >10yr preg interval) no         HIGH History of Preeclampsia no Recommend low-dose aspirin if     Multifetal gestation no 1 or more high risk factors    Chronic HTN no     Type 1 or 2 Diabetes no     Renal Disease no     Autoimmune Disease  no          Contraindications to ASA therapy:  NSAID/ ASA allergy: no  Asthma with history of ASA induced bronchospasm: no  Relative contraindications:  History of GI bleed: no  Active peptic ulcer disease:  no  Severe hepatic dysfunction: no    Patient should be recommended to take ASA 162mg during this pregnancy from 12-36wks to lower her risk of preeclampsia:   Moderate risk factors in the setting of AMA, nulliparity and obesity.  Initiation of Low dose ASA is recommended and to be reviewed with patient by provider.        The patient has a history now or in prior pregnancy notable for:  See Below       Details that I feel the provider should be aware of:   Yael is a current patient of the practice. This is her second pregnancy. This is a planned and welcomed pregnancy. Her pregnancy history includes a miscarriage in 6/2023, that did not require medical management.   AMA  Obesity-BMI>30   *1 hour gtt ordered with risk factors that include AMA, BMI>30 and father with history of DM)   Medical History includes:  Anxiety (Social)-previously treated with Wellbutrin, stopped taking in 2021. Situational.   Migraines without aura-Treated with Topamax yrs ago. Now Taking Tylenol as needed with knowledge of pregnancy.      *[place] [facility]   PN1 visit scheduled. The patient was oriented to our practice, the navigator role, reviewed delivering physicians and [facility] for Delivery. All questions were answered.    Interviewed by: TAIWO Diaz RN

## 2025-04-24 ENCOUNTER — INITIAL PRENATAL (OUTPATIENT)
Dept: OBGYN CLINIC | Facility: CLINIC | Age: 35
End: 2025-04-24

## 2025-04-24 VITALS
HEIGHT: 65 IN | WEIGHT: 200 LBS | SYSTOLIC BLOOD PRESSURE: 140 MMHG | DIASTOLIC BLOOD PRESSURE: 68 MMHG | BODY MASS INDEX: 33.32 KG/M2

## 2025-04-24 DIAGNOSIS — Z36.89 ENCOUNTER FOR OTHER SPECIFIED ANTENATAL SCREENING: Primary | ICD-10-CM

## 2025-04-24 DIAGNOSIS — E66.09 OTHER OBESITY DUE TO EXCESS CALORIES AFFECTING PREGNANCY IN FIRST TRIMESTER: ICD-10-CM

## 2025-04-24 DIAGNOSIS — O99.211 OTHER OBESITY DUE TO EXCESS CALORIES AFFECTING PREGNANCY IN FIRST TRIMESTER: ICD-10-CM

## 2025-04-24 DIAGNOSIS — O09.521 AMA (ADVANCED MATERNAL AGE) MULTIGRAVIDA 35+, FIRST TRIMESTER: ICD-10-CM

## 2025-04-30 LAB
EXTERNAL HEPATITIS B SURFACE ANTIGEN: NEGATIVE
EXTERNAL HIV-1/2 AB-AG: NORMAL
EXTERNAL RH FACTOR: POSITIVE
EXTERNAL RUBELLA IGG QUANTITATION: NORMAL
EXTERNAL SYPHILIS TOTAL IGG/IGM SCREENING: NORMAL

## 2025-05-01 ENCOUNTER — INITIAL PRENATAL (OUTPATIENT)
Dept: OBGYN CLINIC | Facility: CLINIC | Age: 35
End: 2025-05-01
Payer: COMMERCIAL

## 2025-05-01 VITALS
HEIGHT: 65 IN | DIASTOLIC BLOOD PRESSURE: 82 MMHG | SYSTOLIC BLOOD PRESSURE: 142 MMHG | BODY MASS INDEX: 33.15 KG/M2 | WEIGHT: 199 LBS

## 2025-05-01 DIAGNOSIS — Z3A.11 11 WEEKS GESTATION OF PREGNANCY: Primary | ICD-10-CM

## 2025-05-01 DIAGNOSIS — Z86.59 HISTORY OF ANXIETY: ICD-10-CM

## 2025-05-01 DIAGNOSIS — E66.811 OBESITY (BMI 30.0-34.9): ICD-10-CM

## 2025-05-01 DIAGNOSIS — Z86.69 HISTORY OF MIGRAINE: ICD-10-CM

## 2025-05-01 DIAGNOSIS — Z11.3 SCREEN FOR STD (SEXUALLY TRANSMITTED DISEASE): ICD-10-CM

## 2025-05-01 DIAGNOSIS — O09.521 AMA (ADVANCED MATERNAL AGE) MULTIGRAVIDA 35+, FIRST TRIMESTER: ICD-10-CM

## 2025-05-01 LAB
EXTERNAL CHLAMYDIA SCREEN: NORMAL
EXTERNAL GONORRHEA SCREEN: NORMAL

## 2025-05-01 PROCEDURE — 99213 OFFICE O/P EST LOW 20 MIN: CPT | Performed by: OBSTETRICS & GYNECOLOGY

## 2025-05-01 NOTE — PROGRESS NOTES
"Routine Prenatal Visit  [facility] - [address], [place] [address]    Assessment/Plan:  Yael is a 35 y.o. year old  at 11w5d who presents for routine prenatal visit.     Assessment & Plan  11 weeks gestation of pregnancy        -  Early 1 Hr GTT, Pn panel done yesterday.  Results pending  Orders:    POCT urine dip    Obesity (BMI 30.0-34.9)         AMA (advanced maternal age) multigravida 35+, first trimester  Obese, Nulli and AMA ----   mg weeks 12 to 36       History of anxiety  Stopped Wellbutrin        History of migraine  Was treated with Topamax years ago  -Taking Tylenol current pregnancy       Screen for STD (sexually transmitted disease)    Orders:    Chlamydia/GC JEAN CARLOS, Confirmation; Future      Next OB Visit 4 weeks.    Subjective:     CC: Prenatal care    Yael Blum is a 35 y.o.  female who presents for routine prenatal care at 11w5d.  Pregnancy ROS: no leakage of fluid, pelvic pain, or vaginal bleeding.  Normal fetal movement.    The following portions of the patient's history were reviewed and updated as appropriate: allergies, current medications, past family history, past medical history, obstetric history, gynecologic history, past social history, past surgical history and problem list.      Objective:  /82 (BP Location: Right arm, Patient Position: Sitting, Cuff Size: Standard)   Ht 5' 5\" (1.651 m)   Wt 90.3 kg (199 lb)   LMP 2025 (Exact Date)   BMI 33.12 kg/m²   Pregravid Weight/BMI: 86.6 kg (191 lb) (BMI 31.78)  Current Weight: 90.3 kg (199 lb)   Total Weight Gain: 3.629 kg (8 lb)   Pre- Vitals      Flowsheet Row Most Recent Value   Prenatal Assessment    Fetal Heart Rate 160   Fundal Height (cm) 12 cm   Movement Absent   Prenatal Vitals    Blood Pressure 142/82   Weight - Scale 90.3 kg (199 lb)   Urine Albumin/Glucose    Dilation/Effacement/Station    Cervical Dilation 0   Cervical Effacement 0   Vaginal Drainage "    Draining Fluid No   Edema              General: Well appearing, no distress  Abdomen: Soft, gravid, nontender  Extremities: Non tender.

## 2025-05-02 ENCOUNTER — RESULTS FOLLOW-UP (OUTPATIENT)
Dept: OBGYN CLINIC | Facility: CLINIC | Age: 35
End: 2025-05-02

## 2025-05-02 DIAGNOSIS — R73.09 ABNORMAL GLUCOSE TOLERANCE TEST: Primary | ICD-10-CM

## 2025-05-02 LAB
ABO GROUP BLD: ABNORMAL
APPEARANCE UR: CLEAR
BACTERIA UR CULT: ABNORMAL
BACTERIA UR CULT: NORMAL
BACTERIA URNS QL MICRO: NORMAL
BASOPHILS # BLD AUTO: 0 X10E3/UL (ref 0–0.2)
BASOPHILS NFR BLD AUTO: 0 %
BILIRUB UR QL STRIP: NEGATIVE
BLD GP AB SCN SERPL QL: NEGATIVE
C TRACH RRNA SPEC QL NAA+PROBE: NEGATIVE
CASTS URNS QL MICRO: NORMAL /LPF
COLOR UR: YELLOW
EOSINOPHIL # BLD AUTO: 0 X10E3/UL (ref 0–0.4)
EOSINOPHIL NFR BLD AUTO: 0 %
EPI CELLS #/AREA URNS HPF: NORMAL /HPF (ref 0–10)
ERYTHROCYTE [DISTWIDTH] IN BLOOD BY AUTOMATED COUNT: 15 % (ref 11.7–15.4)
GLUCOSE 1H P 50 G GLC PO SERPL-MCNC: 177 MG/DL (ref 70–139)
GLUCOSE UR QL: ABNORMAL
HBV SURFACE AG SERPL QL IA: NEGATIVE
HCT VFR BLD AUTO: 39.8 % (ref 34–46.6)
HCV AB S/CO SERPL IA: NON REACTIVE
HGB BLD-MCNC: 12.5 G/DL (ref 11.1–15.9)
HGB UR QL STRIP: NEGATIVE
HIV 1+2 AB+HIV1 P24 AG SERPL QL IA: NON REACTIVE
IMM GRANULOCYTES # BLD: 0 X10E3/UL (ref 0–0.1)
IMM GRANULOCYTES NFR BLD: 0 %
KETONES UR QL STRIP: NEGATIVE
LEUKOCYTE ESTERASE UR QL STRIP: ABNORMAL
LYMPHOCYTES # BLD AUTO: 1 X10E3/UL (ref 0.7–3.1)
LYMPHOCYTES NFR BLD AUTO: 10 %
MCH RBC QN AUTO: 23.8 PG (ref 26.6–33)
MCHC RBC AUTO-ENTMCNC: 31.4 G/DL (ref 31.5–35.7)
MCV RBC AUTO: 76 FL (ref 79–97)
MICRO URNS: ABNORMAL
MONOCYTES # BLD AUTO: 0.3 X10E3/UL (ref 0.1–0.9)
MONOCYTES NFR BLD AUTO: 3 %
N GONORRHOEA RRNA SPEC QL NAA+PROBE: NEGATIVE
NEUTROPHILS # BLD AUTO: 8.5 X10E3/UL (ref 1.4–7)
NEUTROPHILS NFR BLD AUTO: 87 %
NITRITE UR QL STRIP: NEGATIVE
PH UR STRIP: 6.5 [PH] (ref 5–7.5)
PLATELET # BLD AUTO: 296 X10E3/UL (ref 150–450)
PROT UR QL STRIP: NEGATIVE
RBC # BLD AUTO: 5.26 X10E6/UL (ref 3.77–5.28)
RBC #/AREA URNS HPF: NORMAL /HPF (ref 0–2)
RH BLD: POSITIVE
RPR SER QL: NON REACTIVE
RUBV IGG SERPL IA-ACNC: 1.38 INDEX
SL AMB INTERPRETATION: NORMAL
SP GR UR: 1.01 (ref 1–1.03)
UROBILINOGEN UR STRIP-ACNC: 0.2 MG/DL (ref 0.2–1)
WBC # BLD AUTO: 9.9 X10E3/UL (ref 3.4–10.8)
WBC #/AREA URNS HPF: NORMAL /HPF (ref 0–5)

## 2025-05-05 LAB
C TRACH RRNA SPEC QL NAA+PROBE: NEGATIVE
N GONORRHOEA RRNA SPEC QL NAA+PROBE: NEGATIVE

## 2025-05-07 ENCOUNTER — ROUTINE PRENATAL (OUTPATIENT)
Dept: PERINATAL CARE | Facility: OTHER | Age: 35
End: 2025-05-07
Attending: STUDENT IN AN ORGANIZED HEALTH CARE EDUCATION/TRAINING PROGRAM
Payer: COMMERCIAL

## 2025-05-07 VITALS
WEIGHT: 200.4 LBS | SYSTOLIC BLOOD PRESSURE: 128 MMHG | HEART RATE: 111 BPM | DIASTOLIC BLOOD PRESSURE: 66 MMHG | BODY MASS INDEX: 33.39 KG/M2 | HEIGHT: 65 IN

## 2025-05-07 DIAGNOSIS — Z3A.01 7 WEEKS GESTATION OF PREGNANCY: ICD-10-CM

## 2025-05-07 DIAGNOSIS — Z36.82 ENCOUNTER FOR NUCHAL TRANSLUCENCY TESTING: ICD-10-CM

## 2025-05-07 DIAGNOSIS — O09.521 AMA (ADVANCED MATERNAL AGE) MULTIGRAVIDA 35+, FIRST TRIMESTER: ICD-10-CM

## 2025-05-07 DIAGNOSIS — Z3A.12 12 WEEKS GESTATION OF PREGNANCY: Primary | ICD-10-CM

## 2025-05-07 PROCEDURE — 76801 OB US < 14 WKS SINGLE FETUS: CPT | Performed by: OBSTETRICS & GYNECOLOGY

## 2025-05-07 PROCEDURE — 76813 OB US NUCHAL MEAS 1 GEST: CPT | Performed by: OBSTETRICS & GYNECOLOGY

## 2025-05-07 PROCEDURE — 99243 OFF/OP CNSLTJ NEW/EST LOW 30: CPT | Performed by: OBSTETRICS & GYNECOLOGY

## 2025-05-07 RX ORDER — ASPIRIN 81 MG/1
81 TABLET, CHEWABLE ORAL DAILY
COMMUNITY

## 2025-05-07 NOTE — PROGRESS NOTES
Yael presents today for a genetic screening ultrasound.  This is her second pregnancy.  She is currently taking low-dose aspirin to reduce risk for preeclampsia.  She has a BMI of 31 and is 35 years old.  She has no other significant contributory medical, surgical, substance use, or family history.  A review of systems is otherwise negative.    We discussed the options for genetic screening, including but not limited to first trimester screening, second trimester screening, combined first and second trimester screening, noninvasive prenatal testing (NIPT) for patients at high risk and diagnostic screening through the use of CVS and amniocentesis. We discussed the risks and benefits of each approach including the sensitivities and false positive rates as well as the difference between a screening test and a diagnostic test. At the conclusion of our discussion the patient elected noninvasive prenatal testing. She will contact her insurance company to determine the cost of undergoing this test and return to the  Center for a requisition and possibly to have her blood drawn for this test depending on which lab she chooses. The results should be available approximately 7-10 days after her blood draw.    Recommend an anatomy ultrasound be scheduled for approximately 20 weeks gestation.    Thank you very much for allowing us to participate in the care of this very nice patient. Should you have any questions, please do not hesitate to contact our office.

## 2025-05-13 ENCOUNTER — RESULTS FOLLOW-UP (OUTPATIENT)
Dept: OBGYN CLINIC | Facility: CLINIC | Age: 35
End: 2025-05-13

## 2025-05-13 LAB
GLUCOSE 1H P 100 G GLC PO SERPL-MCNC: 171 MG/DL (ref 70–179)
GLUCOSE 2H P 100 G GLC PO SERPL-MCNC: 154 MG/DL (ref 70–154)
GLUCOSE 3H P 100 G GLC PO SERPL-MCNC: 67 MG/DL (ref 70–139)
GLUCOSE P FAST SERPL-MCNC: 111 MG/DL (ref 70–94)
SL AMB NOTE:: ABNORMAL

## 2025-05-30 ENCOUNTER — ROUTINE PRENATAL (OUTPATIENT)
Dept: OBGYN CLINIC | Facility: CLINIC | Age: 35
End: 2025-05-30
Payer: COMMERCIAL

## 2025-05-30 VITALS
BODY MASS INDEX: 34.16 KG/M2 | DIASTOLIC BLOOD PRESSURE: 78 MMHG | SYSTOLIC BLOOD PRESSURE: 124 MMHG | WEIGHT: 205 LBS | HEIGHT: 65 IN

## 2025-05-30 DIAGNOSIS — Z36.89 ENCOUNTER FOR OTHER SPECIFIED ANTENATAL SCREENING: ICD-10-CM

## 2025-05-30 DIAGNOSIS — R73.09 ABNORMAL GLUCOSE TOLERANCE TEST: ICD-10-CM

## 2025-05-30 DIAGNOSIS — Z3A.15 15 WEEKS GESTATION OF PREGNANCY: Primary | ICD-10-CM

## 2025-05-30 LAB
SL AMB  POCT GLUCOSE, UA: NORMAL
SL AMB POCT URINE PROTEIN: NORMAL

## 2025-05-30 PROCEDURE — 81002 URINALYSIS NONAUTO W/O SCOPE: CPT | Performed by: OBSTETRICS & GYNECOLOGY

## 2025-05-30 PROCEDURE — 99213 OFFICE O/P EST LOW 20 MIN: CPT | Performed by: OBSTETRICS & GYNECOLOGY

## 2025-05-30 NOTE — PROGRESS NOTES
"Routine Prenatal Visit  [facility] - [address], [address], [place] [address]    Assessment/Plan:  Yael is a 35 y.o. year old  at 15w6d who presents for routine prenatal visit.     Assessment & Plan  15 weeks gestation of pregnancy    Orders:    POCT urine dip    Abnormal glucose tolerance test    -  3 hour with 1/4 value elevated.    -  Will order 3 hr GTT with 28 wk labs       Encounter for other specified  screening    Orders:    Alpha fetoprotein, maternal; Future      Next OB Visit 4 weeks.    Subjective:     CC: Prenatal care    Yael Blum is a 35 y.o.  female who presents for routine prenatal care at 15w6d.  Pregnancy ROS: no leakage of fluid, pelvic pain, or vaginal bleeding.  Has not felt fetal movement since start of pregnancy.    The following portions of the patient's history were reviewed and updated as appropriate: allergies, current medications, past family history, past medical history, obstetric history, gynecologic history, past social history, past surgical history and problem list.      Objective:  /78 (BP Location: Right arm, Patient Position: Sitting, Cuff Size: Standard)   Ht 5' 5\" (1.651 m)   Wt 93 kg (205 lb)   LMP 2025 (Exact Date)   BMI 34.11 kg/m²   Pregravid Weight/BMI: 86.6 kg (191 lb) (BMI 31.78)  Current Weight: 93 kg (205 lb)   Total Weight Gain: 6.35 kg (14 lb)   Pre- Vitals      Flowsheet Row Most Recent Value   Prenatal Assessment    Prenatal Vitals    Blood Pressure 124/78   Weight - Scale 93 kg (205 lb)   Urine Albumin/Glucose    Dilation/Effacement/Station    Vaginal Drainage    Edema              General: Well appearing, no distress  Abdomen: Soft, gravid, nontender  Extremities: Non tender.  "

## 2025-06-04 ENCOUNTER — TELEPHONE (OUTPATIENT)
Dept: OBGYN CLINIC | Facility: CLINIC | Age: 35
End: 2025-06-04

## 2025-06-04 NOTE — TELEPHONE ENCOUNTER
Second Trimester call-Voicemail received, left a message for patient to call back and sent message through [employer].     Overall how are you doing?     Compliant with routine OB care appointments? yes    Have you completed your 1st trimester labs? Yes, Needed a 3 hr gtt-  3hr gtt is in process     If you had NIPS with MFM, do you have a order for MSAFP?   Order was provided on 5/30/25   Can be completed 15w-22w6d, ideally 16w-18w    Have you seen MFM and do you have your detailed US scheduled? 7/14/25    Pregnancy Education-have you had a chance to review the classes offered and registered?

## 2025-06-11 LAB
2ND TRIMESTER 4 SCREEN SERPL-IMP: NORMAL
AFP ADJ MOM SERPL: 1.23
AFP INTERP AMN-IMP: NORMAL
AFP INTERP SERPL-IMP: NORMAL
AFP INTERP SERPL-IMP: NORMAL
AFP SERPL-MCNC: 40 NG/ML
AGE AT DELIVERY: 37.8 YR
GA METHOD: NORMAL
GA: 17.3 WEEKS
IDDM PATIENT QL: NO
MULTIPLE PREGNANCY: NO
NEURAL TUBE DEFECT RISK FETUS: 5926 %

## 2025-06-27 ENCOUNTER — ROUTINE PRENATAL (OUTPATIENT)
Dept: OBGYN CLINIC | Facility: CLINIC | Age: 35
End: 2025-06-27
Payer: COMMERCIAL

## 2025-06-27 VITALS
WEIGHT: 206.6 LBS | BODY MASS INDEX: 34.42 KG/M2 | SYSTOLIC BLOOD PRESSURE: 122 MMHG | HEIGHT: 65 IN | DIASTOLIC BLOOD PRESSURE: 80 MMHG

## 2025-06-27 DIAGNOSIS — Z86.59 HISTORY OF ANXIETY: ICD-10-CM

## 2025-06-27 DIAGNOSIS — Z86.69 HISTORY OF MIGRAINE: ICD-10-CM

## 2025-06-27 DIAGNOSIS — O09.521 AMA (ADVANCED MATERNAL AGE) MULTIGRAVIDA 35+, FIRST TRIMESTER: ICD-10-CM

## 2025-06-27 DIAGNOSIS — R73.09 ABNORMAL GLUCOSE TOLERANCE TEST: Primary | ICD-10-CM

## 2025-06-27 DIAGNOSIS — Z3A.19 19 WEEKS GESTATION OF PREGNANCY: ICD-10-CM

## 2025-06-27 PROCEDURE — 99213 OFFICE O/P EST LOW 20 MIN: CPT | Performed by: OBSTETRICS & GYNECOLOGY

## 2025-06-27 NOTE — PROGRESS NOTES
"Routine Prenatal Visit  [facility] [address], [address], [place] [address]    Assessment/Plan:  Yael is a 35 y.o. year old  at 19w6d who presents for routine prenatal visit.     1. Abnormal glucose tolerance test  Assessment & Plan:  Repeat 3hr with 28wk labs  2. History of migraine  Assessment & Plan:  Doing well, none since first trimester  3. History of anxiety  Assessment & Plan:  Stable without meds  4. AMA (advanced maternal age) multigravida 35+, first trimester  5. 19 weeks gestation of pregnancy        Subjective:   Yael Blum is a 35 y.o.  who presents for routine prenatal care at 19w6d.  Complaints today: Denies  LOF: -; VB: -; Contractions: -; FM: +    Objective:  /80 (BP Location: Left arm, Patient Position: Sitting, Cuff Size: Standard)   Ht 5' 5\" (1.651 m)   Wt 93.7 kg (206 lb 9.6 oz)   LMP 2025 (Exact Date)   BMI 34.38 kg/m²     General: Well appearing, no distress  Respiratory: Unlabored breathing  Abdomen: Soft, gravid, nontender  Extremities: Warm and well perfused.  Non tender.    Pregravid Weight/BMI: 86.6 kg (191 lb) (BMI 31.78)  Current Weight: 93.7 kg (206 lb 9.6 oz)   Total Weight Gain: 7.076 kg (15 lb 9.6 oz)     Pre-Khadra Vitals      Flowsheet Row Most Recent Value   Prenatal Assessment    Fetal Heart Rate 147   Movement Present   Prenatal Vitals    Blood Pressure 122/80   Weight - Scale 93.7 kg (206 lb 9.6 oz)   Urine Albumin/Glucose    Dilation/Effacement/Station    Vaginal Drainage    Edema              Shil V Mckenna, DO  2025 9:24 AM     "

## 2025-07-14 ENCOUNTER — ANCILLARY PROCEDURE (OUTPATIENT)
Dept: PERINATAL CARE | Facility: OTHER | Age: 35
End: 2025-07-14
Attending: OBSTETRICS & GYNECOLOGY
Payer: COMMERCIAL

## 2025-07-14 VITALS
HEIGHT: 65 IN | SYSTOLIC BLOOD PRESSURE: 138 MMHG | BODY MASS INDEX: 34.52 KG/M2 | WEIGHT: 207.2 LBS | DIASTOLIC BLOOD PRESSURE: 72 MMHG | HEART RATE: 97 BPM

## 2025-07-14 DIAGNOSIS — Z3A.22 22 WEEKS GESTATION OF PREGNANCY: ICD-10-CM

## 2025-07-14 DIAGNOSIS — Z3A.22 22 WEEKS GESTATION OF PREGNANCY: Primary | ICD-10-CM

## 2025-07-14 PROCEDURE — 76811 OB US DETAILED SNGL FETUS: CPT | Performed by: OBSTETRICS & GYNECOLOGY

## 2025-07-14 PROCEDURE — 76817 TRANSVAGINAL US OBSTETRIC: CPT | Performed by: OBSTETRICS & GYNECOLOGY

## 2025-07-14 NOTE — PROGRESS NOTES
[facility]: Yael was seen today for anatomic survey and cervical length screening ultrasound. Report with images are contained in the ultrasound report located under Chart Review tab in Epic.  Please call our office at [phone] with questions.  -Shira French MD

## 2025-07-25 ENCOUNTER — ROUTINE PRENATAL (OUTPATIENT)
Dept: OBGYN CLINIC | Facility: CLINIC | Age: 35
End: 2025-07-25
Payer: COMMERCIAL

## 2025-07-25 VITALS — BODY MASS INDEX: 34.28 KG/M2 | DIASTOLIC BLOOD PRESSURE: 82 MMHG | SYSTOLIC BLOOD PRESSURE: 118 MMHG | WEIGHT: 206 LBS

## 2025-07-25 DIAGNOSIS — K21.9 GASTROESOPHAGEAL REFLUX DISEASE WITHOUT ESOPHAGITIS: ICD-10-CM

## 2025-07-25 DIAGNOSIS — E66.811 OBESITY (BMI 30.0-34.9): ICD-10-CM

## 2025-07-25 DIAGNOSIS — Z86.59 HISTORY OF ANXIETY: ICD-10-CM

## 2025-07-25 DIAGNOSIS — Z36.89 ENCOUNTER FOR OTHER SPECIFIED ANTENATAL SCREENING: Primary | ICD-10-CM

## 2025-07-25 DIAGNOSIS — Z86.69 HISTORY OF MIGRAINE: ICD-10-CM

## 2025-07-25 DIAGNOSIS — O09.521 AMA (ADVANCED MATERNAL AGE) MULTIGRAVIDA 35+, FIRST TRIMESTER: ICD-10-CM

## 2025-07-25 DIAGNOSIS — Z3A.23 23 WEEKS GESTATION OF PREGNANCY: ICD-10-CM

## 2025-07-25 DIAGNOSIS — R73.09 ABNORMAL GLUCOSE TOLERANCE TEST: ICD-10-CM

## 2025-07-25 PROCEDURE — 99213 OFFICE O/P EST LOW 20 MIN: CPT | Performed by: STUDENT IN AN ORGANIZED HEALTH CARE EDUCATION/TRAINING PROGRAM

## 2025-07-25 RX ORDER — FAMOTIDINE 20 MG/1
20 TABLET, FILM COATED ORAL 2 TIMES DAILY PRN
Qty: 60 TABLET | Refills: 1 | Status: SHIPPED | OUTPATIENT
Start: 2025-07-25 | End: 2025-09-23

## 2025-07-25 NOTE — ASSESSMENT & PLAN NOTE
- Initial labs up to date   - Discussed 3rd trimester labs, orders placed   - Negative AFP screen   - Level II US completed, next US scheduled 9/15/25 with MFM   - Reviewed common discomforts of pregnancy in second trimester and warning signs.  - Advised to continue medications and return in 3-4 weeks

## 2025-07-25 NOTE — PROGRESS NOTES
2nd Trimester Visit  Name: Yael Blum      : 1990      MRN: [id number]  Encounter Provider: Philip Vallejo MD  Encounter Date: 2025   [facility]    35 y.o.  at 23w6d presenting for routine OB visit at 23w6d.:  Assessment & Plan  Encounter for other specified  screening    Orders:    CBC; Future    RPR, Rfx Qn RPR/Confirm TP; Future    Glucose WILIAN 3HR 100GM PREG PTS; Future    AMA (advanced maternal age) multigravida 35+, first trimester  - Following with MFM  - Continues on ASA therapy until 36 weeks         History of migraine  - Reports no current HA's         Abnormal glucose tolerance test  - 3 hour GTT ordered with 3rd trimester labs        Obesity (BMI 30.0-34.9)  - BMI 34        History of anxiety  - Reports mood stable         23 weeks gestation of pregnancy  - Initial labs up to date   - Discussed 3rd trimester labs, orders placed   - Negative AFP screen   - Level II US completed, next US scheduled 9/15/25 with MFM   - Reviewed common discomforts of pregnancy in second trimester and warning signs.  - Advised to continue medications and return in 3-4 weeks         Gastroesophageal reflux disease without esophagitis    Orders:    famotidine (PEPCID) 20 mg tablet; Take 1 tablet (20 mg total) by mouth 2 (two) times a day as needed for heartburn      History of Present Illness     She reports doing well. Reports heart burn, not controlled with TUMS.   Denies uterine contractions or persistent cramping.  Denies vaginal bleeding or leaking of fluid.  Reports fetal movement.    Past Medical History   Past Medical History[1]  Past Surgical History[2]  Family History[3]   reports that she has never smoked. She has never used smokeless tobacco. She reports that she does not currently use alcohol. She reports that she does not use drugs.  Current Outpatient Medications   Medication Instructions    aspirin 81 mg, Daily    doxylamine  (UNISOM) 25 mg, Oral, Daily at bedtime PRN    famotidine (PEPCID) 20 mg, Oral, 2 times daily PRN    Prenatal Multivit-Min-Fe-FA (PRE-JATIN FORMULA PO)     pyridoxine (B-6) 25 mg, Oral, Every 6 hours PRN   Allergies[4]      Current Outpatient Medications   Medication Instructions    aspirin 81 mg, Daily    doxylamine (UNISOM) 25 mg, Oral, Daily at bedtime PRN    famotidine (PEPCID) 20 mg, Oral, 2 times daily PRN    Prenatal Multivit-Min-Fe-FA (PRE- FORMULA PO)     pyridoxine (B-6) 25 mg, Oral, Every 6 hours PRN     Objective   /82 (BP Location: Right arm, Patient Position: Sitting, Cuff Size: Standard)   Wt 93.4 kg (206 lb)   LMP 2025 (Exact Date)   BMI 34.28 kg/m²      BP: Blood Pressure: 118/82  Wt: 93.4 kg (206 lb); Body mass index is 34.28 kg/m².; TWG=6.804 kg (15 lb)   ; Fundal Height (cm): 24 cm  Abdomen: ”soft”,”non tender”   BPM    G2 Problems (from 25 to present)       Problem Noted Diagnosed Resolved    23 weeks gestation of pregnancy 2025 by Philip Vallejo MD  No    Abnormal glucose tolerance test 2025 by Jenni Frederick DO  No    Overview Addendum 2025  9:06 PM by Jenni Frederick DO   EARLY 1 Np=701    Early 3 Hr with 1/4 elevated values    [  ] 3 Hr with 28 wk labs                    [1]   Past Medical History:  Diagnosis Date    Anxiety     Migraine     Miscarriage 2023   [2]   Past Surgical History:  Procedure Laterality Date    WISDOM TOOTH EXTRACTION Left    [3]   Family History  Problem Relation Name Age of Onset    Hypertension Mother Suad Bird     Miscarriages / Stillbirths Mother Suad Bird     Diabetes Father Hayden Bird    Breast cancer Maternal Grandmother Ana Aaron     Pancreatic cancer Maternal Grandmother Ana Aaron     Hodgkin's lymphoma Paternal Grandmother      Heart disease Paternal Grandfather Cole Bird    [4] No Known Allergies

## 2025-07-30 ENCOUNTER — TELEPHONE (OUTPATIENT)
Age: 35
End: 2025-07-30

## 2025-08-22 ENCOUNTER — ROUTINE PRENATAL (OUTPATIENT)
Dept: OBGYN CLINIC | Facility: CLINIC | Age: 35
End: 2025-08-22
Payer: COMMERCIAL

## 2025-08-22 VITALS
WEIGHT: 208 LBS | BODY MASS INDEX: 34.66 KG/M2 | DIASTOLIC BLOOD PRESSURE: 60 MMHG | SYSTOLIC BLOOD PRESSURE: 124 MMHG | HEIGHT: 65 IN

## 2025-08-22 DIAGNOSIS — R73.09 ABNORMAL GLUCOSE TOLERANCE TEST: ICD-10-CM

## 2025-08-22 DIAGNOSIS — Z86.59 HISTORY OF ANXIETY: ICD-10-CM

## 2025-08-22 DIAGNOSIS — O09.521 AMA (ADVANCED MATERNAL AGE) MULTIGRAVIDA 35+, FIRST TRIMESTER: ICD-10-CM

## 2025-08-22 DIAGNOSIS — Z3A.27 27 WEEKS GESTATION OF PREGNANCY: Primary | ICD-10-CM

## 2025-08-22 LAB
DME PARACHUTE DELIVERY DATE REQUESTED: NORMAL
DME PARACHUTE ITEM DESCRIPTION: NORMAL
DME PARACHUTE ORDER STATUS: NORMAL
DME PARACHUTE SUPPLIER NAME: NORMAL
DME PARACHUTE SUPPLIER PHONE: NORMAL

## 2025-08-22 PROCEDURE — 99213 OFFICE O/P EST LOW 20 MIN: CPT | Performed by: OBSTETRICS & GYNECOLOGY
